# Patient Record
Sex: MALE | Race: BLACK OR AFRICAN AMERICAN | NOT HISPANIC OR LATINO
[De-identification: names, ages, dates, MRNs, and addresses within clinical notes are randomized per-mention and may not be internally consistent; named-entity substitution may affect disease eponyms.]

---

## 2018-04-25 PROBLEM — Z00.00 ENCOUNTER FOR PREVENTIVE HEALTH EXAMINATION: Status: ACTIVE | Noted: 2018-04-25

## 2018-05-08 PROBLEM — R13.19 MECHANICAL DYSPHAGIA: Status: ACTIVE | Noted: 2018-05-08

## 2018-05-08 PROBLEM — Z86.69 HISTORY OF GLAUCOMA: Status: RESOLVED | Noted: 2018-05-08 | Resolved: 2018-05-08

## 2018-05-08 PROBLEM — Z82.49 FAMILY HISTORY OF ESSENTIAL HYPERTENSION: Status: ACTIVE | Noted: 2018-05-08

## 2018-05-08 PROBLEM — E04.2 MULTIPLE THYROID NODULES: Status: ACTIVE | Noted: 2018-05-08

## 2018-05-10 ENCOUNTER — APPOINTMENT (OUTPATIENT)
Dept: SURGERY | Facility: CLINIC | Age: 63
End: 2018-05-10
Payer: COMMERCIAL

## 2018-05-10 VITALS
WEIGHT: 160 LBS | BODY MASS INDEX: 25.71 KG/M2 | SYSTOLIC BLOOD PRESSURE: 149 MMHG | HEART RATE: 71 BPM | DIASTOLIC BLOOD PRESSURE: 80 MMHG | HEIGHT: 66 IN

## 2018-05-10 DIAGNOSIS — Z82.49 FAMILY HISTORY OF ISCHEMIC HEART DISEASE AND OTHER DISEASES OF THE CIRCULATORY SYSTEM: ICD-10-CM

## 2018-05-10 DIAGNOSIS — E04.2 NONTOXIC MULTINODULAR GOITER: ICD-10-CM

## 2018-05-10 DIAGNOSIS — Z86.69 PERSONAL HISTORY OF OTHER DISEASES OF THE NERVOUS SYSTEM AND SENSE ORGANS: ICD-10-CM

## 2018-05-10 DIAGNOSIS — R13.19 OTHER DYSPHAGIA: ICD-10-CM

## 2018-05-10 PROCEDURE — 99205 OFFICE O/P NEW HI 60 MIN: CPT

## 2018-05-10 RX ORDER — BIMATOPROST 0.1 MG/ML
0.01 SOLUTION/ DROPS OPHTHALMIC
Refills: 0 | Status: ACTIVE | COMMUNITY

## 2018-05-10 RX ORDER — TIMOLOL MALEATE 5 MG/ML
SOLUTION/ DROPS OPHTHALMIC
Refills: 0 | Status: ACTIVE | COMMUNITY

## 2018-05-15 ENCOUNTER — OUTPATIENT (OUTPATIENT)
Dept: OUTPATIENT SERVICES | Facility: HOSPITAL | Age: 63
LOS: 1 days | End: 2018-05-15
Payer: COMMERCIAL

## 2018-05-15 VITALS
SYSTOLIC BLOOD PRESSURE: 140 MMHG | WEIGHT: 160.06 LBS | DIASTOLIC BLOOD PRESSURE: 82 MMHG | RESPIRATION RATE: 16 BRPM | OXYGEN SATURATION: 99 % | HEART RATE: 72 BPM | HEIGHT: 65 IN | TEMPERATURE: 98 F

## 2018-05-15 DIAGNOSIS — H40.9 UNSPECIFIED GLAUCOMA: ICD-10-CM

## 2018-05-15 DIAGNOSIS — G47.33 OBSTRUCTIVE SLEEP APNEA (ADULT) (PEDIATRIC): ICD-10-CM

## 2018-05-15 DIAGNOSIS — Z98.83 FILTERING (VITREOUS) BLEB AFTER GLAUCOMA SURGERY STATUS: Chronic | ICD-10-CM

## 2018-05-15 DIAGNOSIS — E55.9 VITAMIN D DEFICIENCY, UNSPECIFIED: ICD-10-CM

## 2018-05-15 DIAGNOSIS — E04.1 NONTOXIC SINGLE THYROID NODULE: ICD-10-CM

## 2018-05-15 DIAGNOSIS — R03.0 ELEVATED BLOOD-PRESSURE READING, WITHOUT DIAGNOSIS OF HYPERTENSION: ICD-10-CM

## 2018-05-15 LAB
BLD GP AB SCN SERPL QL: NEGATIVE — SIGNIFICANT CHANGE UP
BUN SERPL-MCNC: 10 MG/DL — SIGNIFICANT CHANGE UP (ref 7–23)
CALCIUM SERPL-MCNC: 9.4 MG/DL — SIGNIFICANT CHANGE UP (ref 8.4–10.5)
CHLORIDE SERPL-SCNC: 100 MMOL/L — SIGNIFICANT CHANGE UP (ref 98–107)
CO2 SERPL-SCNC: 31 MMOL/L — SIGNIFICANT CHANGE UP (ref 22–31)
CREAT SERPL-MCNC: 1.34 MG/DL — HIGH (ref 0.5–1.3)
GLUCOSE SERPL-MCNC: 88 MG/DL — SIGNIFICANT CHANGE UP (ref 70–99)
HCT VFR BLD CALC: 40.5 % — SIGNIFICANT CHANGE UP (ref 39–50)
HGB BLD-MCNC: 12.7 G/DL — LOW (ref 13–17)
MCHC RBC-ENTMCNC: 24.3 PG — LOW (ref 27–34)
MCHC RBC-ENTMCNC: 31.4 % — LOW (ref 32–36)
MCV RBC AUTO: 77.4 FL — LOW (ref 80–100)
NRBC # FLD: 0 — SIGNIFICANT CHANGE UP
PLATELET # BLD AUTO: 187 K/UL — SIGNIFICANT CHANGE UP (ref 150–400)
PMV BLD: 10.8 FL — SIGNIFICANT CHANGE UP (ref 7–13)
POTASSIUM SERPL-MCNC: 3.9 MMOL/L — SIGNIFICANT CHANGE UP (ref 3.5–5.3)
POTASSIUM SERPL-SCNC: 3.9 MMOL/L — SIGNIFICANT CHANGE UP (ref 3.5–5.3)
RBC # BLD: 5.23 M/UL — SIGNIFICANT CHANGE UP (ref 4.2–5.8)
RBC # FLD: 14.3 % — SIGNIFICANT CHANGE UP (ref 10.3–14.5)
RH IG SCN BLD-IMP: POSITIVE — SIGNIFICANT CHANGE UP
SODIUM SERPL-SCNC: 141 MMOL/L — SIGNIFICANT CHANGE UP (ref 135–145)
WBC # BLD: 7.06 K/UL — SIGNIFICANT CHANGE UP (ref 3.8–10.5)
WBC # FLD AUTO: 7.06 K/UL — SIGNIFICANT CHANGE UP (ref 3.8–10.5)

## 2018-05-15 PROCEDURE — 93010 ELECTROCARDIOGRAM REPORT: CPT

## 2018-05-15 RX ORDER — SODIUM CHLORIDE 9 MG/ML
1000 INJECTION, SOLUTION INTRAVENOUS
Qty: 0 | Refills: 0 | Status: DISCONTINUED | OUTPATIENT
Start: 2018-05-21 | End: 2018-05-22

## 2018-05-15 NOTE — H&P PST ADULT - HISTORY OF PRESENT ILLNESS
63 y/o male with PMH of   pt states a lump on his left neck noted so PCP recommended to see Chang who performed a bx that was benign. Dr Dean passed away so pt went to see Dr. Friend (endocrinologist) who referred him to Dr Dobson due to increased size of nodule noted. Pt recommended surgical intervention at this time for which is now scheduled. 61 y/o male with PMH of Glaucoma. Presents to [facility] with a preop dx of nontoxic single thyroid nodule and to be evaluated for a scheduled left thyroid lobectomy with central neck dissection possible total on 5/21/18.  pt states a lump on his left neckwas noted so PCP recommended him to see Dr Fairbanks who performed a bx that was benign. Dr Dean passed away so pt went to see Dr. Friend (endocrinologist) who referred him to Dr Dobson due to increased size of nodule noted. Pt recommended surgical intervention at this time for which is now scheduled.

## 2018-05-15 NOTE — H&P PST ADULT - PROBLEM SELECTOR PLAN 1
pt is scheduled for a left thyroid lobectomy with central neck dissection possible total on 5/21/18. Preop instructions provided including npo status, Pepcid and Hibiclens. Pt to stop centrum today. verbalized understanding. pt is scheduled for a left thyroid lobectomy with central neck dissection possible total on 5/21/18. Preop instructions provided including npo status, Pepcid and Hibiclens. Pt to stop centrum today. verbalized understanding.   Pt has a cap that comes out, instructed to remove it for sx.

## 2018-05-15 NOTE — H&P PST ADULT - NEGATIVE CARDIOVASCULAR SYMPTOMS
no palpitations/no dyspnea on exertion/no peripheral edema/no chest pain/no orthopnea/no paroxysmal nocturnal dyspnea/no claudication

## 2018-05-15 NOTE — H&P PST ADULT - NEGATIVE ENMT SYMPTOMS
deneis paresthesias to B/L UE and states FROM/no abnormal taste sensation/no hearing difficulty/no dry mouth/no ear pain/no nasal congestion/no nasal obstruction/no gum bleeding/no sinus symptoms/no recurrent cold sores/no throat pain/no dysphagia/no tinnitus/no nasal discharge/no post-nasal discharge/no nose bleeds no dysphagia/no hearing difficulty/denies paresthesias to B/L UE and states FROM/no ear pain/no tinnitus/no nasal congestion/no nasal discharge/no post-nasal discharge/no gum bleeding/no dry mouth/no sinus symptoms/no nasal obstruction/no nose bleeds/no throat pain/no recurrent cold sores/no abnormal taste sensation

## 2018-05-15 NOTE — H&P PST ADULT - RS GEN PE MLT RESP DETAILS PC
no rales/no subcutaneous emphysema/good air movement/no wheezes/no chest wall tenderness/no intercostal retractions/respirations non-labored/breath sounds equal/airway patent/no rhonchi/clear to auscultation bilaterally

## 2018-05-15 NOTE — H&P PST ADULT - FAMILY HISTORY
Mother  Still living? No  Family history of bone cancer, Age at diagnosis: Age Unknown     Sibling  Still living? Yes, Estimated age: Age Unknown  Family history of glaucoma, Age at diagnosis: Age Unknown

## 2018-05-15 NOTE — H&P PST ADULT - NSANTHOSAYNRD_GEN_A_CORE
never tested/No. BRETT screening performed.  STOP BANG Legend: 0-2 = LOW Risk; 3-4 = INTERMEDIATE Risk; 5-8 = HIGH Risk

## 2018-05-15 NOTE — H&P PST ADULT - PROBLEM SELECTOR PLAN 3
At PST Blood Pressure to be 140/82, states was told to be borderline by his pcp, pt to re evaluate on  on 5/17/18, form provided w/ request to re evaluate Blood Pressure and EKG to be faxed for comparison.

## 2018-05-20 ENCOUNTER — TRANSCRIPTION ENCOUNTER (OUTPATIENT)
Age: 63
End: 2018-05-20

## 2018-05-21 ENCOUNTER — RESULT REVIEW (OUTPATIENT)
Age: 63
End: 2018-05-21

## 2018-05-21 ENCOUNTER — APPOINTMENT (OUTPATIENT)
Dept: SURGERY | Facility: HOSPITAL | Age: 63
End: 2018-05-21

## 2018-05-21 ENCOUNTER — OUTPATIENT (OUTPATIENT)
Dept: OUTPATIENT SERVICES | Facility: HOSPITAL | Age: 63
LOS: 1 days | Discharge: HOME | End: 2018-05-21
Payer: COMMERCIAL

## 2018-05-21 VITALS
RESPIRATION RATE: 16 BRPM | TEMPERATURE: 98 F | HEIGHT: 65 IN | WEIGHT: 160.06 LBS | HEART RATE: 62 BPM | SYSTOLIC BLOOD PRESSURE: 146 MMHG | OXYGEN SATURATION: 97 % | DIASTOLIC BLOOD PRESSURE: 80 MMHG

## 2018-05-21 VITALS
OXYGEN SATURATION: 100 % | DIASTOLIC BLOOD PRESSURE: 78 MMHG | RESPIRATION RATE: 16 BRPM | TEMPERATURE: 99 F | SYSTOLIC BLOOD PRESSURE: 136 MMHG | HEART RATE: 90 BPM

## 2018-05-21 DIAGNOSIS — Z98.83 FILTERING (VITREOUS) BLEB AFTER GLAUCOMA SURGERY STATUS: Chronic | ICD-10-CM

## 2018-05-21 DIAGNOSIS — E04.1 NONTOXIC SINGLE THYROID NODULE: ICD-10-CM

## 2018-05-21 LAB — RH IG SCN BLD-IMP: POSITIVE — SIGNIFICANT CHANGE UP

## 2018-05-21 PROCEDURE — 13132 CMPLX RPR F/C/C/M/N/AX/G/H/F: CPT | Mod: 59

## 2018-05-21 PROCEDURE — 88307 TISSUE EXAM BY PATHOLOGIST: CPT | Mod: 26

## 2018-05-21 PROCEDURE — 60252 REMOVAL OF THYROID: CPT

## 2018-05-21 RX ORDER — CHOLECALCIFEROL (VITAMIN D3) 125 MCG
1 CAPSULE ORAL
Qty: 0 | Refills: 0 | COMMUNITY

## 2018-05-21 RX ORDER — MULTIVIT-MIN/FERROUS GLUCONATE 9 MG/15 ML
1 LIQUID (ML) ORAL
Qty: 0 | Refills: 0 | COMMUNITY

## 2018-05-21 RX ORDER — IBUPROFEN 200 MG
1 TABLET ORAL
Qty: 0 | Refills: 0 | COMMUNITY

## 2018-05-21 RX ORDER — DORZOLAMIDE HYDROCHLORIDE 20 MG/ML
0 SOLUTION/ DROPS OPHTHALMIC
Qty: 0 | Refills: 0 | COMMUNITY

## 2018-05-21 RX ORDER — BIMATOPROST 0.3 MG/ML
0 SOLUTION/ DROPS OPHTHALMIC
Qty: 0 | Refills: 0 | COMMUNITY

## 2018-05-21 RX ORDER — IBUPROFEN 200 MG
400 TABLET ORAL EVERY 6 HOURS
Qty: 0 | Refills: 0 | Status: DISCONTINUED | OUTPATIENT
Start: 2018-05-21 | End: 2018-05-22

## 2018-05-21 RX ORDER — ACETAMINOPHEN 500 MG
650 TABLET ORAL EVERY 6 HOURS
Qty: 0 | Refills: 0 | Status: DISCONTINUED | OUTPATIENT
Start: 2018-05-21 | End: 2018-05-22

## 2018-05-21 RX ADMIN — SODIUM CHLORIDE 30 MILLILITER(S): 9 INJECTION, SOLUTION INTRAVENOUS at 07:10

## 2018-05-21 NOTE — ASU DISCHARGE PLAN (ADULT/PEDIATRIC). - NURSING INSTRUCTIONS
You were given Toradol for pain management. Please DO Not take Motrin/Ibuprofen/Advil/Aleve (NSAIDS) for the next 6 hours (Until 4p)

## 2018-05-21 NOTE — BRIEF OPERATIVE NOTE - OPERATION/FINDINGS
Procedure: L thyroidectomy    Findings: An incision was made in the neck. Platysma was divided. Strap muscle was . Given the size of the thyroid mass, strap muscle was divided. L lobe was then removed, ligating the vessels. Strap muscle was then reapproximated. Platysma and skin was then closed.

## 2018-05-21 NOTE — ASU DISCHARGE PLAN (ADULT/PEDIATRIC). - SPECIAL INSTRUCTIONS
For pain, you may take 2 tylenol + 1 Ibuprofen 3 times a day with meals, for 3 days. After 3 days you make take it as needed.   You may take Tylenol PM at night to aid with sleeping. For the first 3 days, take 2 Tylenol + 1 ibuprofen 3 times a day with meals. After the 3 days, you make take it as needed.   You may take Tylenol PM at night to aid with sleeping.

## 2018-05-21 NOTE — ASU DISCHARGE PLAN (ADULT/PEDIATRIC). - MEDICATION SUMMARY - MEDICATIONS TO TAKE
I will START or STAY ON the medications listed below when I get home from the hospital:    Tylenol 500 mg oral tablet  -- 2 tab(s) by mouth 3 times a day (before meals)  -- Indication: For Pain    ibuprofen 400 mg oral tablet  -- 1 tab(s) by mouth 3 times a day (before meals)  -- Indication: For Pain    dorzolamide 2% ophthalmic solution  -- 1 drop left ear 2 times daily  -- Indication: For Home med    Lumigan  -- 1 drop left ear at bedtime daily  -- Indication: For Home med    Centrum oral tablet  -- 1 tab(s) by mouth once a day last dose 5/15/2018  -- Indication: For Home med    Vitamin D3 2000 intl units oral tablet  -- 1 tab(s) by mouth once a day  -- Indication: For Home med

## 2018-05-21 NOTE — ASU DISCHARGE PLAN (ADULT/PEDIATRIC). - ITEMS TO FOLLOWUP WITH YOUR PHYSICIAN'S
Please follow up with Dr. Dobson after discharge - call office to make follow-up appointment for Friday 5/25.  Keep dressing dry until follow-up appointment, sponge or tub bath only.

## 2018-05-22 RX ORDER — ACETAMINOPHEN 500 MG
2 TABLET ORAL
Qty: 0 | Refills: 0 | COMMUNITY
Start: 2018-05-22 | End: 2018-05-24

## 2018-05-25 ENCOUNTER — APPOINTMENT (OUTPATIENT)
Dept: SURGERY | Facility: CLINIC | Age: 63
End: 2018-05-25
Payer: COMMERCIAL

## 2018-05-25 PROCEDURE — 99024 POSTOP FOLLOW-UP VISIT: CPT

## 2018-06-29 ENCOUNTER — APPOINTMENT (OUTPATIENT)
Dept: SURGERY | Facility: CLINIC | Age: 63
End: 2018-06-29
Payer: COMMERCIAL

## 2018-06-29 DIAGNOSIS — E04.1 NONTOXIC SINGLE THYROID NODULE: ICD-10-CM

## 2018-06-29 PROCEDURE — 99024 POSTOP FOLLOW-UP VISIT: CPT

## 2020-03-04 NOTE — H&P PST ADULT - VENOUS THROMBOEMBOLISM SCORE
5 Nsaids Counseling: NSAID Counseling: I discussed with the patient that NSAIDs should be taken with food. Prolonged use of NSAIDs can result in the development of stomach ulcers.  Patient advised to stop taking NSAIDs if abdominal pain occurs.  The patient verbalized understanding of the proper use and possible adverse effects of NSAIDs.  All of the patient's questions and concerns were addressed.

## 2021-01-10 NOTE — ASU PREOP CHECKLIST - RESPIRATORY RATE (BREATHS/MIN)
16 I have reviewed and confirmed nurses' notes for patient's medications, allergies, medical history, and surgical history.

## 2023-11-21 NOTE — ASU PATIENT PROFILE, ADULT - AS SC BRADEN FRICTION
Received referral from [facility] via fax. Contacted pt to schedule appt. No answer. Left vm. Referral scanned into media.    No (3) no apparent problem

## 2024-04-09 NOTE — ASU PREOP CHECKLIST - INTERNAL PROSTHESES
Spoke to Mila Bey with [facility]. She states patient case is in secondary appeal for admission to [facility].   no
